# Patient Record
Sex: MALE | Race: WHITE | HISPANIC OR LATINO | Employment: FULL TIME | URBAN - METROPOLITAN AREA
[De-identification: names, ages, dates, MRNs, and addresses within clinical notes are randomized per-mention and may not be internally consistent; named-entity substitution may affect disease eponyms.]

---

## 2017-10-27 ENCOUNTER — HOSPITAL ENCOUNTER (EMERGENCY)
Facility: HOSPITAL | Age: 32
Discharge: HOME | End: 2017-10-27
Attending: EMERGENCY MEDICINE
Payer: OTHER MISCELLANEOUS

## 2017-10-27 VITALS
OXYGEN SATURATION: 97 % | WEIGHT: 230 LBS | HEART RATE: 83 BPM | RESPIRATION RATE: 16 BRPM | SYSTOLIC BLOOD PRESSURE: 130 MMHG | TEMPERATURE: 98 F | DIASTOLIC BLOOD PRESSURE: 90 MMHG

## 2017-10-27 DIAGNOSIS — R03.0 ELEVATED BLOOD PRESSURE READING WITHOUT DIAGNOSIS OF HYPERTENSION: ICD-10-CM

## 2017-10-27 DIAGNOSIS — Z23 NEED FOR PROPHYLACTIC VACCINATION WITH COMBINED DIPHTHERIA-TETANUS-PERTUSSIS (DTAP) VACCINE: ICD-10-CM
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
## 2017-10-27 LAB
ANION GAP SERPL CALC-SCNC: 11 MMOL/L
BASOPHILS # BLD AUTO: 0.07 K/UL
BASOPHILS NFR BLD: 1 %
BUN SERPL-MCNC: 14 MG/DL
CALCIUM SERPL-MCNC: 9.9 MG/DL
CHLORIDE SERPL-SCNC: 105 MMOL/L
CO2 SERPL-SCNC: 27 MMOL/L
CREAT SERPL-MCNC: 1 MG/DL
DIFFERENTIAL METHOD: ABNORMAL
EOSINOPHIL # BLD AUTO: 0.1 K/UL
EOSINOPHIL NFR BLD: 1 %
ERYTHROCYTE [DISTWIDTH] IN BLOOD BY AUTOMATED COUNT: 12.6 %
EST. GFR  (AFRICAN AMERICAN): >60 ML/MIN/1.73 M^2
EST. GFR  (NON AFRICAN AMERICAN): >60 ML/MIN/1.73 M^2
GLUCOSE SERPL-MCNC: 86 MG/DL
HCT VFR BLD AUTO: 46.4 %
HGB BLD-MCNC: 16.1 G/DL
HIV1+2 IGG SERPL QL IA.RAPID: NEGATIVE
LYMPHOCYTES # BLD AUTO: 1.9 K/UL
LYMPHOCYTES NFR BLD: 26.5 %
MCH RBC QN AUTO: 30.4 PG
MCHC RBC AUTO-ENTMCNC: 34.7 G/DL
MCV RBC AUTO: 88 FL
MONOCYTES # BLD AUTO: 0.5 K/UL
MONOCYTES NFR BLD: 6.5 %
NEUTROPHILS # BLD AUTO: 4.6 K/UL
NEUTROPHILS NFR BLD: 64.6 %
PLATELET # BLD AUTO: 370 K/UL
PMV BLD AUTO: 9 FL
POTASSIUM SERPL-SCNC: 4 MMOL/L
RBC # BLD AUTO: 5.3 M/UL
SODIUM SERPL-SCNC: 143 MMOL/L
WBC # BLD AUTO: 7.09 K/UL

## 2017-10-27 PROCEDURE — 85025 COMPLETE CBC W/AUTO DIFF WBC: CPT

## 2017-10-27 PROCEDURE — 25000003 PHARM REV CODE 250: Performed by: NURSE PRACTITIONER

## 2017-10-27 PROCEDURE — 86703 HIV-1/HIV-2 1 RESULT ANTBDY: CPT

## 2017-10-27 PROCEDURE — 90715 TDAP VACCINE 7 YRS/> IM: CPT | Performed by: NURSE PRACTITIONER

## 2017-10-27 PROCEDURE — 99284 EMERGENCY DEPT VISIT MOD MDM: CPT

## 2017-10-27 PROCEDURE — 90471 IMMUNIZATION ADMIN: CPT | Performed by: NURSE PRACTITIONER

## 2017-10-27 PROCEDURE — 25000003 PHARM REV CODE 250: Performed by: EMERGENCY MEDICINE

## 2017-10-27 PROCEDURE — 86706 HEP B SURFACE ANTIBODY: CPT

## 2017-10-27 PROCEDURE — 86703 HIV-1/HIV-2 1 RESULT ANTBDY: CPT | Mod: 91

## 2017-10-27 PROCEDURE — 86705 HEP B CORE ANTIBODY IGM: CPT

## 2017-10-27 PROCEDURE — 63600175 PHARM REV CODE 636 W HCPCS: Performed by: NURSE PRACTITIONER

## 2017-10-27 PROCEDURE — 80048 BASIC METABOLIC PNL TOTAL CA: CPT

## 2017-10-27 RX ORDER — TENOFOVIR DISOPROXIL FUMARATE 300 MG/1
300 TABLET, FILM COATED ORAL ONCE
Status: COMPLETED | OUTPATIENT
Start: 2017-10-27 | End: 2017-10-27

## 2017-10-27 RX ORDER — EMTRICITABINE AND TENOFOVIR DISOPROXIL FUMARATE 200; 300 MG/1; MG/1
1 TABLET, FILM COATED ORAL
Status: DISCONTINUED | OUTPATIENT
Start: 2017-10-27 | End: 2017-10-27 | Stop reason: RX

## 2017-10-27 RX ORDER — EMTRICITABINE 200 MG/1
200 CAPSULE ORAL ONCE
Status: COMPLETED | OUTPATIENT
Start: 2017-10-27 | End: 2017-10-27

## 2017-10-27 RX ADMIN — RALTEGRAVIR 400 MG: 400 TABLET, FILM COATED ORAL at 01:10

## 2017-10-27 RX ADMIN — CLOSTRIDIUM TETANI TOXOID ANTIGEN (FORMALDEHYDE INACTIVATED), CORYNEBACTERIUM DIPHTHERIAE TOXOID ANTIGEN (FORMALDEHYDE INACTIVATED), BORDETELLA PERTUSSIS TOXOID ANTIGEN (GLUTARALDEHYDE INACTIVATED), BORDETELLA PERTUSSIS FILAMENTOUS HEMAGGLUTININ ANTIGEN (FORMALDEHYDE INACTIVATED), BORDETELLA PERTUSSIS PERTACTIN ANTIGEN, AND BORDETELLA PERTUSSIS FIMBRIAE 2/3 ANTIGEN 0.5 ML: 5; 2; 2.5; 5; 3; 5 INJECTION, SUSPENSION INTRAMUSCULAR at 02:10

## 2017-10-27 RX ADMIN — TENOFOVIR DISOPROXIL FUMARATE 300 MG: 300 TABLET, COATED ORAL at 01:10

## 2017-10-27 RX ADMIN — EMTRICITABINE 200 MG: 200 CAPSULE ORAL at 01:10

## 2017-10-27 NOTE — ED NOTES
IPSO  searching for weapons and stuck with needle in suspects pocket. Immediate bleeding at puncture site. On arrival no bleeding.

## 2017-10-27 NOTE — ED PROVIDER NOTES
Encounter Date: 10/27/2017       History     Chief Complaint   Patient presents with    Body Fluid Exposure     right palm of hand accidently needle stick     The history is provided by the patient.   Hand Injury    The incident occurred just prior to arrival. The incident occurred at work. Injury mechanism: Patient was accidentally stuck with a used hypodermic needle to the palmar surface of his right hand. The pain is present in the right hand. The pain is at a severity of 0/10. Pertinent negatives include no fever. He reports no foreign bodies present. Treatments tried: soap and water wash.   Mr. Chavez presents to the emergency department with complaints of an accidental needlestick to his right hand.  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  The patient denies any further complaints at this time.        PCP:   David Good MD        Review of patient's allergies indicates:  No Known Allergies  History reviewed. No pertinent past medical history.  Past Surgical History:   Procedure Laterality Date    NO PAST SURGERIES       History reviewed. No pertinent family history.  Social History   Substance Use Topics    Smoking status: Never Smoker    Smokeless tobacco: Never Used    Alcohol use Yes      Comment: Socially     Review of Systems   Constitutional: Negative for fever.   HENT: Negative for sore throat.    Respiratory: Negative for shortness of breath.    Cardiovascular: Negative for chest pain.   Gastrointestinal: Negative for nausea.   Genitourinary: Negative for dysuria.   Musculoskeletal: Negative for back pain.   Skin: Positive for wound (needlestick to right hand). Negative for rash.   Neurological: Negative for weakness.   Hematological: Does not bruise/bleed easily.       Physical Exam     Initial Vitals [10/27/17 1348]   BP Pulse Resp Temp SpO2   (!) 132/98  93 18 98 °F (36.7 °C) 97 %      MAP       109.33         Physical Exam    Nursing note and vitals reviewed.  Constitutional: He appears well-developed and well-nourished. He is cooperative. He does not appear ill. No distress.   HENT:   Head: Normocephalic and atraumatic.   Nose: Nose normal.   Mouth/Throat: Uvula is midline, oropharynx is clear and moist and mucous membranes are normal.   Eyes: Conjunctivae, EOM and lids are normal. Pupils are equal, round, and reactive to light.   Neck: Trachea normal and normal range of motion. Neck supple.   Cardiovascular: Normal rate, regular rhythm, intact distal pulses and normal pulses.   Pulmonary/Chest: Effort normal. No respiratory distress.   Musculoskeletal: Normal range of motion. He exhibits no edema.        Right hand: He exhibits normal range of motion, no bony tenderness, normal capillary refill and no swelling. Normal sensation noted. Normal strength noted.   Neurological: He is alert and oriented to person, place, and time. He has normal strength. GCS eye subscore is 4. GCS verbal subscore is 5. GCS motor subscore is 6.   Neurovascular intact to all extremities. Normal gait.    Skin: Skin is warm and dry. Capillary refill takes less than 2 seconds. No rash noted. There is erythema (small puncture wound secondary to needlestick to the palmar surface of the right hand just distal of the third MCP - puncture wound measures approximately 0.1 mm - it is surrounded by an erythematous area measuring 0.5 mm - no bleeding or drainage noted ).   Psychiatric: He has a normal mood and affect. His speech is normal and behavior is normal. Thought content normal.                 ED Course   Procedures     ED Abnormal Lab Results:   Labs Reviewed   CBC W/ AUTO DIFFERENTIAL - Abnormal; Notable for the following:        Result Value    Platelets 370 (*)     MPV 9.0 (*)     All other components within normal limits   RAPID HIV   BASIC METABOLIC PANEL   HEPATITIS B SURFACE ANTIBODY    HEPATITIS B CORE ANTIBODY, IGM   HIV 1 / 2 ANTIBODY         ED Lab Results:   Results for orders placed or performed during the hospital encounter of 10/27/17   Rapid HIV   Result Value Ref Range    HIV Rapid Testing Negative Negative   CBC auto differential   Result Value Ref Range    WBC 7.09 3.90 - 12.70 K/uL    RBC 5.30 4.60 - 6.20 M/uL    Hemoglobin 16.1 14.0 - 18.0 g/dL    Hematocrit 46.4 40.0 - 54.0 %    MCV 88 82 - 98 fL    MCH 30.4 27.0 - 31.0 pg    MCHC 34.7 32.0 - 36.0 g/dL    RDW 12.6 11.5 - 14.5 %    Platelets 370 (H) 150 - 350 K/uL    MPV 9.0 (L) 9.2 - 12.9 fL    Gran # 4.6 1.8 - 7.7 K/uL    Lymph # 1.9 1.0 - 4.8 K/uL    Mono # 0.5 0.3 - 1.0 K/uL    Eos # 0.1 0.0 - 0.5 K/uL    Baso # 0.07 0.00 - 0.20 K/uL    Gran% 64.6 38.0 - 73.0 %    Lymph% 26.5 18.0 - 48.0 %    Mono% 6.5 4.0 - 15.0 %    Eosinophil% 1.0 0.0 - 8.0 %    Basophil% 1.0 0.0 - 1.9 %    Differential Method Automated    Basic metabolic panel   Result Value Ref Range    Sodium 143 136 - 145 mmol/L    Potassium 4.0 3.5 - 5.1 mmol/L    Chloride 105 95 - 110 mmol/L    CO2 27 23 - 29 mmol/L    Glucose 86 70 - 110 mg/dL    BUN, Bld 14 6 - 20 mg/dL    Creatinine 1.0 0.5 - 1.4 mg/dL    Calcium 9.9 8.7 - 10.5 mg/dL    Anion Gap 11 8 - 16 mmol/L    eGFR if African American >60.0 >60 mL/min/1.73 m^2    eGFR if non African American >60.0 >60 mL/min/1.73 m^2       ED Medications:   Medications   raltegravir tablet 400 mg (400 mg Oral Given 10/27/17 1357)   emtricitabine capsule 200 mg (200 mg Oral Given 10/27/17 3257)   tenofovir tablet 300 mg (300 mg Oral Given 10/27/17 1357)   Tdap vaccine injection 0.5 mL (0.5 mLs Intramuscular Given 10/27/17 1444)           ED Course Vitals  Vitals:    10/27/17 1348 10/27/17 1444   BP: (!) 132/98 (!) 130/90   BP Location: Left arm    Patient Position: Sitting    Pulse: 93 83   Resp: 18 16   Temp: 98 °F (36.7 °C)    TempSrc: Oral    SpO2: 97%    Weight: 104.3 kg (230 lb)          1505 HOURS RE-EVALUATION &  DISPOSITION:   Reassessment at the time of disposition demonstrates that the patient is resting comfortably in no acute distress.  He has remained hemodynamically stable throughout the entire ED visit and is without objective evidence for acute process requiring urgent intervention or hospitalization. I discussed test results and provided counseling to patient with regard to condition, the treatment plan, specific conditions for return, and the importance of follow up.  Answered questions at this time. The patient is stable for discharge.               Medical Decision Making:   History:   Old Records Summarized: records from clinic visits.  Clinical Tests:   Lab Tests: Ordered and Reviewed  Other:   I have discussed this case with another health care provider.       <> Summary of the Discussion: Attending Physician:  Roberto Alejandre MD                     Clinical Impression:       ICD-10-CM ICD-9-CM   1. Accidental needlestick injury with exposure to body fluid Z77.21 E920.4    W27.3XXA V87.39   2. Legal intervention involving other sharp objects, law enforcement official injured, initial encounter Y35.491A E974   3. Need for prophylactic vaccination with combined diphtheria-tetanus-pertussis (DTaP) vaccine Z23 V06.1   4. Elevated blood pressure reading without diagnosis of hypertension R03.0 796.2         Disposition:   Disposition: Discharged  Condition: Stable  I discussed with patient that the evaluation in the emergency department does not suggest any emergent or life threatening medical condition requiring immediate intervention beyond what was provided in the ED, and I believe patient is safe for discharge.  Regardless, an unremarkable evaluation in the ED does not preclude the development or presence of a serious of life threatening condition. As such, patient was instructed to return immediately for any worsening or change in current symptoms. I also discussed the results of my evaluation and  diagnostics with patient and he concurs with the evaluation and management plan.  Detailed written and verbal instructions provided to patient and he expressed a verbal understanding of the discharge instructions and overall management plan. Reiterated the importance of medication administration and safety and advised patient to follow up with primary care provider in 3-5 days or sooner if needed.  Also advised patient to return to the ER for any complications.     Regarding ELEVATED BLOOD PRESSURE WITHOUT DIAGNOSIS OF HYPERTENSION/PRE-HYPERTENSION, I advised patient to: keep a record of blood pressure results; avoid medications that contain heart stimulants, including over-the-counter drugs such as decongestants; maintain a healthy weight; cut back on sodium intake (i.e., limit canned, dried, packaged, and fast foods and dont add salt to food); follow the DASH (Dietary Approaches to Stop Hypertension) eating plan which recommends vegetables, fruits, whole gains, and other heart healthy foods; begin an exercise program that includes  aerobic exercise 3 to 4 times a week for an average of 40 minutes at a time (with approval of cardiologist or primary care provider); limit drinks that contain alcohol and caffeine; control levels of emotional stress; and seek emergency care for any shortness of breath, chest pain, difficulty speaking, confusion, or visual changes.  I also recommended following up with the primary care provider for re-evaluation of blood pressure and determine if further treatment may be required.    Regarding TETANUS VACCINATIONS, I advised patient that the tetanus-diphtheria vaccine is given to as a booster shot every 10 years, or after an exposure to tetanus under some circumstances (usually after five years depending on injury). Patient was educated on importance of vaccination, source of bacterial infection (usually found in soil, dust and manure and enter the body through breaks in the skin -  usually cuts or puncture wounds caused by contaminated objects), and complications of tetanus/lockjaw (muscles spasms of the jaw, seizures, pneumonia, difficulty swallowing, pulmonary embolism, dyspnea, and death). I discussed signs and symptoms of reactions such as: high fever; weakness; unusual behavior; dyspnea; hoarseness or wheezing; throat swelling; dizziness; tachycardia; hives; and paleness. I emphasized the importance of seeking help immediately at nearest emergency department should signs and symptoms of anaphylaxis develop (i.e., mouth or throat swelling, wheezing, dyspnea, palpitations, or weakness).  I recommended that the patient keep their vaccination schedule up to date and document that they received a tetanus immunization today.            Follow-up Information     Call  David Good MD.    Specialty:  Family Medicine  Why:  If symptoms worsen or as needed  Contact information:  [address]  [facility] [address]  [phone]                                  Demetrius Cantu NP  10/27/17 1019

## 2017-10-27 NOTE — ED NOTES
Pt stable, in NAD, and states no further needs at this time. NP aware of vitals. Pt to be d/c'd home.

## 2017-10-30 LAB
HBV CORE IGM SERPL QL IA: NEGATIVE
HBV SURFACE AB SER-ACNC: NEGATIVE M[IU]/ML
HIV 1+2 AB+HIV1 P24 AG SERPL QL IA: NEGATIVE

## 2017-11-30 ENCOUNTER — OFFICE VISIT (OUTPATIENT)
Dept: INTERNAL MEDICINE | Facility: CLINIC | Age: 32
End: 2017-11-30
Payer: COMMERCIAL

## 2017-11-30 VITALS
HEART RATE: 102 BPM | TEMPERATURE: 97 F | WEIGHT: 235.44 LBS | OXYGEN SATURATION: 96 % | HEIGHT: 71 IN | DIASTOLIC BLOOD PRESSURE: 86 MMHG | BODY MASS INDEX: 32.96 KG/M2 | RESPIRATION RATE: 18 BRPM | SYSTOLIC BLOOD PRESSURE: 133 MMHG

## 2017-11-30 DIAGNOSIS — J00 COMMON COLD: Primary | ICD-10-CM

## 2017-11-30 LAB
FLUAV AG SPEC QL IA: NEGATIVE
FLUBV AG SPEC QL IA: NEGATIVE
SPECIMEN SOURCE: NORMAL

## 2017-11-30 PROCEDURE — 87400 INFLUENZA A/B EACH AG IA: CPT | Mod: 59,PO

## 2017-11-30 PROCEDURE — 99203 OFFICE O/P NEW LOW 30 MIN: CPT | Mod: S$GLB,,, | Performed by: FAMILY MEDICINE

## 2017-11-30 PROCEDURE — 99999 PR PBB SHADOW E&M-EST. PATIENT-LVL III: CPT | Mod: PBBFAC,,, | Performed by: FAMILY MEDICINE

## 2017-11-30 RX ORDER — TRAZODONE HYDROCHLORIDE 50 MG/1
50 TABLET ORAL
COMMUNITY
Start: 2017-06-09 | End: 2020-03-06

## 2017-11-30 RX ORDER — IBUPROFEN 200 MG
400 TABLET ORAL EVERY 6 HOURS PRN
COMMUNITY

## 2017-11-30 NOTE — PATIENT INSTRUCTIONS

## 2017-11-30 NOTE — LETTER
11/30/2017                 [facility]  [address] [address] [address]  Phone: [phone]   11/30/2017    Patient: Marco Antonio Chavez   YOB: 1985   Date of Visit: 11/30/2017       To Whom it May Concern:    Marco Antonio Chavez was seen in my clinic on 11/30/2017. He may return to work on 12/1/17.    If you have any questions or concerns, please don't hesitate to call.    Sincerely,         Meli Giraldo LPN

## 2017-12-05 ENCOUNTER — OFFICE VISIT (OUTPATIENT)
Dept: INTERNAL MEDICINE | Facility: CLINIC | Age: 32
End: 2017-12-05
Payer: COMMERCIAL

## 2017-12-05 VITALS
SYSTOLIC BLOOD PRESSURE: 124 MMHG | HEART RATE: 93 BPM | DIASTOLIC BLOOD PRESSURE: 92 MMHG | BODY MASS INDEX: 33.23 KG/M2 | RESPIRATION RATE: 18 BRPM | TEMPERATURE: 97 F | WEIGHT: 232.13 LBS | HEIGHT: 70 IN | OXYGEN SATURATION: 98 %

## 2017-12-05 DIAGNOSIS — J01.00 ACUTE NON-RECURRENT MAXILLARY SINUSITIS: Primary | ICD-10-CM

## 2017-12-05 PROCEDURE — 99999 PR PBB SHADOW E&M-EST. PATIENT-LVL III: CPT | Mod: PBBFAC,,, | Performed by: FAMILY MEDICINE

## 2017-12-05 PROCEDURE — 99214 OFFICE O/P EST MOD 30 MIN: CPT | Mod: S$GLB,,, | Performed by: FAMILY MEDICINE

## 2017-12-05 RX ORDER — AMOXICILLIN AND CLAVULANATE POTASSIUM 875; 125 MG/1; MG/1
1 TABLET, FILM COATED ORAL EVERY 12 HOURS
Qty: 14 TABLET | Refills: 0 | Status: SHIPPED | OUTPATIENT
Start: 2017-12-05 | End: 2017-12-12

## 2017-12-05 NOTE — PROGRESS NOTES
"Subjective:       Patient ID: Marco Antonio Chavez is a 32 y.o. male.    Chief Complaint: Fever (mostly night) and Fatigue    Sinus Problem   This is a new problem. The current episode started 1 to 4 weeks ago. The problem has been gradually worsening since onset. The maximum temperature recorded prior to his arrival was 102 - 102.9 F. The pain is mild. Associated symptoms include chills, congestion, coughing, headaches, sinus pressure and a sore throat. Past treatments include acetaminophen and oral decongestants. The treatment provided mild relief.     Has been having fever every night for the last few days    There is no problem list on file for this patient.      No family history on file.  Past Surgical History:   Procedure Laterality Date    NO PAST SURGERIES           Current Outpatient Prescriptions:     ibuprofen (ADVIL,MOTRIN) 200 MG tablet, Take 400 mg by mouth every 6 (six) hours as needed., Disp: , Rfl:     traZODone (DESYREL) 50 MG tablet, Take 50 mg by mouth., Disp: , Rfl:     amoxicillin-clavulanate 875-125mg (AUGMENTIN) 875-125 mg per tablet, Take 1 tablet by mouth every 12 (twelve) hours., Disp: 14 tablet, Rfl: 0    Review of Systems   Constitutional: Positive for chills, fatigue and fever.   HENT: Positive for congestion, sinus pressure and sore throat.    Respiratory: Positive for cough.    Neurological: Positive for headaches.       Objective:   BP (!) 124/92 (BP Location: Right arm, Patient Position: Sitting, BP Method: Medium (Manual))   Pulse 93   Temp 97.4 °F (36.3 °C) (Tympanic)   Resp 18   Ht 5' 10" (1.778 m)   Wt 105.3 kg (232 lb 2.3 oz)   SpO2 98%   BMI 33.31 kg/m²      Physical Exam   Constitutional: He is oriented to person, place, and time. He appears well-developed and well-nourished. No distress.   HENT:   Head: Normocephalic and atraumatic.   Nose: Mucosal edema present. Right sinus exhibits maxillary sinus tenderness. Left sinus exhibits maxillary sinus tenderness.   Eyes: " Conjunctivae and EOM are normal. Pupils are equal, round, and reactive to light. Right eye exhibits no discharge. Left eye exhibits no discharge.   Neck: No thyromegaly present.   Cardiovascular: Normal rate, regular rhythm and normal heart sounds.    No murmur heard.  Pulmonary/Chest: Effort normal and breath sounds normal. No respiratory distress. He has no wheezes. He has no rales.   Abdominal: Soft. He exhibits no distension.   Musculoskeletal: He exhibits no edema.   Neurological: He is alert and oriented to person, place, and time.   Skin: Skin is warm. No rash noted. He is not diaphoretic.   Psychiatric: He has a normal mood and affect. His behavior is normal.   Vitals reviewed.      Assessment & Plan      1. Acute non-recurrent maxillary sinusitis  Due to symptoms worsening and waxing/waning fever will start on augmentin. Recommended using tylenol to help with fever. Avoid using ibuprofen and use mylanta to help with stomach irritation.

## 2020-03-06 ENCOUNTER — HOSPITAL ENCOUNTER (EMERGENCY)
Facility: HOSPITAL | Age: 35
Discharge: HOME | End: 2020-03-06
Attending: EMERGENCY MEDICINE
Payer: COMMERCIAL

## 2020-03-06 VITALS
TEMPERATURE: 98 F | OXYGEN SATURATION: 99 % | SYSTOLIC BLOOD PRESSURE: 130 MMHG | BODY MASS INDEX: 33.49 KG/M2 | DIASTOLIC BLOOD PRESSURE: 88 MMHG | RESPIRATION RATE: 17 BRPM | WEIGHT: 233.94 LBS | HEART RATE: 92 BPM | HEIGHT: 70 IN

## 2020-03-06 DIAGNOSIS — M79.642 LEFT HAND PAIN: ICD-10-CM
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
## 2020-03-06 DIAGNOSIS — T75.4XXA INJURY FROM ELECTROSHOCK GUN, INITIAL ENCOUNTER: Primary | ICD-10-CM

## 2020-03-06 PROCEDURE — 25000003 PHARM REV CODE 250: Mod: ER | Performed by: EMERGENCY MEDICINE

## 2020-03-06 PROCEDURE — 99283 EMERGENCY DEPT VISIT LOW MDM: CPT | Mod: 25,ER

## 2020-03-06 RX ORDER — AMLODIPINE BESYLATE 5 MG/1
5 TABLET ORAL DAILY
COMMUNITY

## 2020-03-06 RX ORDER — ESZOPICLONE 2 MG/1
3 TABLET, FILM COATED ORAL NIGHTLY
COMMUNITY

## 2020-03-06 RX ORDER — DEXTROAMPHETAMINE SACCHARATE, AMPHETAMINE ASPARTATE MONOHYDRATE, DEXTROAMPHETAMINE SULFATE AND AMPHETAMINE SULFATE 7.5; 7.5; 7.5; 7.5 MG/1; MG/1; MG/1; MG/1
15 CAPSULE, EXTENDED RELEASE ORAL 3 TIMES DAILY
COMMUNITY

## 2020-03-06 RX ORDER — ERGOCALCIFEROL 1.25 MG/1
50000 CAPSULE ORAL
COMMUNITY

## 2020-03-06 RX ORDER — ACETAMINOPHEN 500 MG
1000 TABLET ORAL
Status: COMPLETED | OUTPATIENT
Start: 2020-03-06 | End: 2020-03-06

## 2020-03-06 RX ADMIN — ACETAMINOPHEN 1000 MG: 500 TABLET ORAL at 01:03

## 2020-03-06 RX ADMIN — BACITRACIN ZINC NEOMYCIN SULFATE POLYMYXIN B SULFATE: 400; 3.5; 5 OINTMENT TOPICAL at 01:03

## 2020-03-06 NOTE — ED NOTES
All DC instructions given and explained to pt. Voiced understanding. Said he called supervisor and does not need a work excuse. DC to home.

## 2020-03-06 NOTE — ED PROVIDER NOTES
Encounter Date: 3/6/2020       History     Chief Complaint   Patient presents with    Hand Injury     work related injury- tased self in left hand 45 minutes PTA.  Pt is right handed.     33 y/o M with PMH of HTN here with c/o of taser injury to left hand just prior to arrival while working. Patient had probe's insert into his left hand, and was tased for less than 5 seconds. The probes were easily removed after the injury. Patient having some moderate residual pain in the left hand, and some difficulty moving the hand, but this is improving as time passes. Patient denies any other injury, LOC, chest pain, burns, or other symptoms at this time.         Review of patient's allergies indicates:   Allergen Reactions    Naproxen sodium Hives and Shortness Of Breath     No reaction to ibuprofen     Past Medical History:   Diagnosis Date    Attention and concentration deficit     Hypertension      Past Surgical History:   Procedure Laterality Date    NO PAST SURGERIES       History reviewed. No pertinent family history.  Social History     Tobacco Use    Smoking status: Never Smoker    Smokeless tobacco: Never Used   Substance Use Topics    Alcohol use: Yes     Comment: Socially    Drug use: No     Review of Systems   Constitutional: Negative for chills and fever.   HENT: Negative for congestion and dental problem.    Eyes: Negative for pain and visual disturbance.   Respiratory: Negative for cough and shortness of breath.    Cardiovascular: Negative for chest pain and palpitations.   Gastrointestinal: Negative for abdominal pain, diarrhea, nausea and vomiting.   Genitourinary: Negative for dysuria and flank pain.   Musculoskeletal: Negative for back pain and neck pain.        Left hand pain   Skin: Positive for wound. Negative for rash.   Neurological: Positive for weakness. Negative for numbness and headaches.        Left hand weakness       Physical Exam     Initial Vitals [03/06/20 1316]   BP Pulse Resp Temp  SpO2   (!) 140/96 103 18 98.9 °F (37.2 °C) 99 %      MAP       --         Physical Exam    Constitutional: He appears well-developed and well-nourished. No distress.   HENT:   Head: Normocephalic and atraumatic.   Eyes: EOM are normal. Pupils are equal, round, and reactive to light.   Neck: Normal range of motion. Neck supple.   Cardiovascular: Normal rate and regular rhythm.   Pulmonary/Chest: Breath sounds normal. No respiratory distress.   Abdominal: He exhibits no distension. There is no tenderness.   Musculoskeletal: He exhibits no tenderness.   Left hand motion limited due to pain and localized edema to the web between the 1st and 2nd digit. There are puncture marks just proximal to the 2nd digit on the palmar side, and a puncture wound where the proximal palmar crease and thenar crease meet. There is tenderness over the thenar eminence and in the web between the 1st and 2nd digit. No obvious bone deformity or residual FB. 2nd digit flexion is limited due to pain.    Neurological: He is alert and oriented to person, place, and time.   5/5 STR in the Left Hand. No numbness in the left hand.    Skin: Skin is warm and dry.   Psychiatric: He has a normal mood and affect.               ED Course   Procedures  Labs Reviewed - No data to display       Imaging Results          X-Ray Hand 3 View Left (Final result)  Result time 03/06/20 13:36:35    Final result by Josiah Swanson MD (03/06/20 13:36:35)                 Impression:      No acute fracture or dislocation.      Electronically signed by: Josiah Swanson MD  Date:    03/06/2020  Time:    13:36             Narrative:    EXAMINATION:  XR HAND COMPLETE 3 VIEW LEFT    CLINICAL HISTORY:  XR HAND COMPLETE 3 VIEW LEFT    COMPARISON:  None    FINDINGS:  Three views of the left hand were obtained.    No evidence of acute fracture or dislocation.  Bony mineralization is normal.  Soft tissues are unremarkable.                                                   ED Course  as of Mar 06 1344   Fri Mar 06, 2020   1340 Tetanus UTD. The patient has no superficial burns. Patient symptoms should improve over time. Will have him see his PCP in 2 days for wound re-check, and repeat exam.     1:42 PM Reassessment: I reassessed the pt.  The pt is resting comfortably and is NAD.  Pt states their sx have improved. I discussed test results, shared treatment plan, specific conditions for return, and the need for f/u. I  answered their questions at this time.  Pt understands and agrees to the plan.  The pt has remained hemodynamically stable through ED course and is stable for discharge.       [BA]      ED Course User Index  [BA] Alexandro Norris MD                Clinical Impression:       ICD-10-CM ICD-9-CM   1. Injury from electroshock gun, initial encounter T75.4XXA 994.8   2. Left hand pain M79.642 729.5             ED Disposition Condition    Discharge Stable        ED Prescriptions     None        Follow-up Information     Follow up With Specialties Details Why Contact Info    David Good MD Family Medicine Schedule an appointment as soon as possible for a visit in 2 days For wound re-check [address] [facility] [address]  [facility] [address]  [phone]                                       Alexandro Norris MD  03/06/20 0255

## 2020-03-06 NOTE — ED NOTES
Tases were removed by coworker. Pt said he was tased for about 5 seconds.  Probes were in for total of 10 minutes.  Dr. Norris removed lucas in triage and assessed.  Pain to left hand with pain to entire palm- specifially left thumb and left index finger with numbness to left palm up to left second and third fingers.  Cap refill less than 2 seconds. Able to fill me touching.  Will xray.   Last tetanus 10/27/17 per Epic

## 2020-03-06 NOTE — ED NOTES
Neosporin to left hand, with nonstick gauze and wrapped with Tasha.   Educated pt on wound care, monitoring for S & S of infection, and need for MD f/u.

## 2021-09-16 ENCOUNTER — IMMUNIZATION (OUTPATIENT)
Dept: PRIMARY CARE CLINIC | Facility: CLINIC | Age: 36
End: 2021-09-16

## 2021-09-16 DIAGNOSIS — Z23 NEED FOR VACCINATION: Primary | ICD-10-CM

## 2021-09-16 PROCEDURE — 91301 COVID-19, MRNA, LNP-S, PF, 100 MCG/0.5 ML DOSE VACCINE: ICD-10-PCS | Mod: S$GLB,,, | Performed by: FAMILY MEDICINE

## 2021-09-16 PROCEDURE — 0012A COVID-19, MRNA, LNP-S, PF, 100 MCG/0.5 ML DOSE VACCINE: ICD-10-PCS | Mod: CV19,S$GLB,, | Performed by: FAMILY MEDICINE

## 2021-09-16 PROCEDURE — 0012A COVID-19, MRNA, LNP-S, PF, 100 MCG/0.5 ML DOSE VACCINE: CPT | Mod: CV19,S$GLB,, | Performed by: FAMILY MEDICINE

## 2021-09-16 PROCEDURE — 91301 COVID-19, MRNA, LNP-S, PF, 100 MCG/0.5 ML DOSE VACCINE: CPT | Mod: S$GLB,,, | Performed by: FAMILY MEDICINE

## 2021-10-25 ENCOUNTER — CLINICAL SUPPORT (OUTPATIENT)
Dept: OTHER | Facility: CLINIC | Age: 36
End: 2021-10-25
Payer: COMMERCIAL

## 2021-10-25 DIAGNOSIS — Z00.8 ENCOUNTER FOR OTHER GENERAL EXAMINATION: ICD-10-CM

## 2021-10-26 VITALS — HEIGHT: 71 IN | BODY MASS INDEX: 32.62 KG/M2

## 2021-10-26 LAB
HDLC SERPL-MCNC: 28 MG/DL
POC CHOLESTEROL, LDL (DOCK): 70 MG/DL
POC CHOLESTEROL, TOTAL: 130 MG/DL
POC GLUCOSE, FASTING: 103 MG/DL (ref 60–110)
TRIGL SERPL-MCNC: 158 MG/DL

## 2023-10-20 ENCOUNTER — HOSPITAL ENCOUNTER (EMERGENCY)
Facility: HOSPITAL | Age: 38
Discharge: HOME | End: 2023-10-20
Attending: EMERGENCY MEDICINE
Payer: COMMERCIAL

## 2023-10-20 VITALS
HEART RATE: 83 BPM | SYSTOLIC BLOOD PRESSURE: 126 MMHG | DIASTOLIC BLOOD PRESSURE: 79 MMHG | RESPIRATION RATE: 18 BRPM | OXYGEN SATURATION: 97 % | HEIGHT: 71 IN | BODY MASS INDEX: 30.8 KG/M2 | WEIGHT: 220 LBS

## 2023-10-20 DIAGNOSIS — Z77.29 EXPOSURE TO POTENTIALLY HAZARDOUS SUBSTANCE: Primary | ICD-10-CM

## 2023-10-20 PROCEDURE — 99282 EMERGENCY DEPT VISIT SF MDM: CPT | Mod: ER

## 2023-10-20 RX ORDER — ZOLPIDEM TARTRATE 10 MG/1
1 TABLET ORAL NIGHTLY PRN
COMMUNITY
Start: 2023-09-18

## 2023-10-20 RX ORDER — ROSUVASTATIN CALCIUM 10 MG/1
10 TABLET, COATED ORAL NIGHTLY
COMMUNITY
Start: 2023-09-29

## 2023-10-20 NOTE — ED PROVIDER NOTES
Encounter Date: 10/20/2023       History     Chief Complaint   Patient presents with    Fentanyl Exposure     [place]     Patient is a 38-year-old male who presents today after an occupational exposure.  Patient is a  and was working at a traffic stop.  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  Patient states he was putting the drugs on a scale.  At some point a white powder substance flew up into the air into his face incident occurred approximately 1 hour ago.  Coworkers state that he has been acting differently since.  Appears to be under the influence.  Patient exhibits slow response time to basic questions.  Patient reports some tingling in his right hand.  No excessive drowsiness, headache, chest pain, shortness of breath, syncope.  No Narcan or other medication was administered prior to arrival      Review of patient's allergies indicates:   Allergen Reactions    Naproxen sodium Hives and Shortness Of Breath     No reaction to ibuprofen     Past Medical History:   Diagnosis Date    Attention and concentration deficit     Hypertension      Past Surgical History:   Procedure Laterality Date    NO PAST SURGERIES       No family history on file.  Social History     Tobacco Use    Smoking status: Never    Smokeless tobacco: Never   Substance Use Topics    Alcohol use: Yes     Comment: Socially    Drug use: No     Review of Systems   Neurological:         Tingling R hand   Psychiatric/Behavioral:  Positive for confusion.    All other systems reviewed and are negative.      Physical Exam     Initial Vitals [10/20/23 0031]   BP Pulse Resp Temp SpO2   128/81 86 20 -- 100 %      MAP       --         Physical Exam    Nursing note and vitals reviewed.  Constitutional: He appears well-developed and well-nourished. No distress.   HENT:   Head: Normocephalic and atraumatic.   Eyes: Conjunctivae and EOM are normal. Pupils are equal, round, and reactive to light.   Pupils not dilated or pinpoint   Neck: Neck  supple. No tracheal deviation present.   Cardiovascular:  Normal rate, regular rhythm, normal heart sounds and intact distal pulses.           Pulmonary/Chest: Breath sounds normal. No respiratory distress.   Musculoskeletal:         General: No tenderness or edema. Normal range of motion.      Cervical back: Neck supple.     Neurological: He is alert and oriented to person, place, and time. GCS score is 15. GCS eye subscore is 4. GCS verbal subscore is 5. GCS motor subscore is 6.   Mild delayed response to basic questions, but is awake alert oriented and eventually answers all questions appropriately   Skin: Skin is warm. No rash noted. No erythema.   Psychiatric: He has a normal mood and affect. His behavior is normal.         ED Course   Procedures  Labs Reviewed - No data to display       Imaging Results    None          Medications - No data to display  Medical Decision Making  Accidental exposure to drugs (white powdery substance).  There was reportedly some initial concern about possible fentanyl.  Patient is not exhibiting any signs of opioid toxicity however.  He is awake, conversing.  Pupils not pinpoint.  No respiratory depression.  No need for Narcan.  Patient was observed in the emergency department with no worsening.  Mild improvement.  Suspect patient is under the influence of a drug, but no need for emergent intervention at this time.  Patient discharged home with ride at bedside    Amount and/or Complexity of Data Reviewed  Independent Historian:      Details: Police provide the details that he seemed slow to answer questions and when he was driving, he seemed to be driving somewhat erratically                               Clinical Impression:   Final diagnoses:  [Z77.29] Exposure to potentially hazardous substance (Primary)        ED Disposition Condition    Discharge Stable          ED Prescriptions    None       Follow-up Information       Follow up With Specialties Details Why Contact Info     [facility] - Emergency Dept Emergency Medicine  As needed, If symptoms worsen [address]  [facility] [id number] [phone]             Josiah Norris MD  10/20/23 0143

## 2024-01-03 ENCOUNTER — HOSPITAL ENCOUNTER (EMERGENCY)
Facility: HOSPITAL | Age: 39
Discharge: HOME | End: 2024-01-03
Attending: EMERGENCY MEDICINE
Payer: COMMERCIAL

## 2024-01-03 VITALS
OXYGEN SATURATION: 95 % | HEART RATE: 76 BPM | DIASTOLIC BLOOD PRESSURE: 83 MMHG | RESPIRATION RATE: 17 BRPM | WEIGHT: 215 LBS | SYSTOLIC BLOOD PRESSURE: 127 MMHG | BODY MASS INDEX: 29.99 KG/M2 | TEMPERATURE: 98 F

## 2024-01-03 DIAGNOSIS — R10.31 RIGHT LOWER QUADRANT ABDOMINAL PAIN: Primary | ICD-10-CM

## 2024-01-03 LAB
ALBUMIN SERPL BCP-MCNC: 4.3 G/DL (ref 3.5–5.2)
ALP SERPL-CCNC: 95 U/L (ref 55–135)
ALT SERPL W/O P-5'-P-CCNC: 66 U/L (ref 10–44)
ANION GAP SERPL CALC-SCNC: 13 MMOL/L (ref 8–16)
AST SERPL-CCNC: 33 U/L (ref 10–40)
BASOPHILS # BLD AUTO: 0.06 K/UL (ref 0–0.2)
BASOPHILS NFR BLD: 0.6 % (ref 0–1.9)
BILIRUB SERPL-MCNC: 0.8 MG/DL (ref 0.1–1)
BILIRUB UR QL STRIP: NEGATIVE
BUN SERPL-MCNC: 16 MG/DL (ref 6–20)
CALCIUM SERPL-MCNC: 9 MG/DL (ref 8.7–10.5)
CHLORIDE SERPL-SCNC: 105 MMOL/L (ref 95–110)
CLARITY UR REFRACT.AUTO: CLEAR
CO2 SERPL-SCNC: 20 MMOL/L (ref 23–29)
COLOR UR AUTO: YELLOW
CREAT SERPL-MCNC: 1.1 MG/DL (ref 0.5–1.4)
DIFFERENTIAL METHOD BLD: ABNORMAL
EOSINOPHIL # BLD AUTO: 0.3 K/UL (ref 0–0.5)
EOSINOPHIL NFR BLD: 2.7 % (ref 0–8)
ERYTHROCYTE [DISTWIDTH] IN BLOOD BY AUTOMATED COUNT: 12.2 % (ref 11.5–14.5)
EST. GFR  (NO RACE VARIABLE): >60 ML/MIN/1.73 M^2
GLUCOSE SERPL-MCNC: 141 MG/DL (ref 70–110)
GLUCOSE UR QL STRIP: NEGATIVE
HCT VFR BLD AUTO: 50.6 % (ref 40–54)
HGB BLD-MCNC: 17.9 G/DL (ref 14–18)
HGB UR QL STRIP: ABNORMAL
IMM GRANULOCYTES # BLD AUTO: 0.05 K/UL (ref 0–0.04)
IMM GRANULOCYTES NFR BLD AUTO: 0.5 % (ref 0–0.5)
KETONES UR QL STRIP: NEGATIVE
LEUKOCYTE ESTERASE UR QL STRIP: NEGATIVE
LIPASE SERPL-CCNC: 25 U/L (ref 4–60)
LYMPHOCYTES # BLD AUTO: 2 K/UL (ref 1–4.8)
LYMPHOCYTES NFR BLD: 19.3 % (ref 18–48)
MCH RBC QN AUTO: 30.3 PG (ref 27–31)
MCHC RBC AUTO-ENTMCNC: 35.4 G/DL (ref 32–36)
MCV RBC AUTO: 86 FL (ref 82–98)
MONOCYTES # BLD AUTO: 0.6 K/UL (ref 0.3–1)
MONOCYTES NFR BLD: 5.7 % (ref 4–15)
NEUTROPHILS # BLD AUTO: 7.4 K/UL (ref 1.8–7.7)
NEUTROPHILS NFR BLD: 71.2 % (ref 38–73)
NITRITE UR QL STRIP: NEGATIVE
NRBC BLD-RTO: 0 /100 WBC
PH UR STRIP: 6 [PH] (ref 5–8)
PLATELET # BLD AUTO: 439 K/UL (ref 150–450)
PMV BLD AUTO: 8.6 FL (ref 9.2–12.9)
POTASSIUM SERPL-SCNC: 3.4 MMOL/L (ref 3.5–5.1)
PROT SERPL-MCNC: 8.3 G/DL (ref 6–8.4)
PROT UR QL STRIP: NEGATIVE
RBC # BLD AUTO: 5.91 M/UL (ref 4.6–6.2)
SODIUM SERPL-SCNC: 138 MMOL/L (ref 136–145)
SP GR UR STRIP: 1.01 (ref 1–1.03)
URN SPEC COLLECT METH UR: ABNORMAL
UROBILINOGEN UR STRIP-ACNC: NEGATIVE EU/DL
WBC # BLD AUTO: 10.32 K/UL (ref 3.9–12.7)

## 2024-01-03 PROCEDURE — 96375 TX/PRO/DX INJ NEW DRUG ADDON: CPT | Mod: ER

## 2024-01-03 PROCEDURE — 63600175 PHARM REV CODE 636 W HCPCS: Mod: ER | Performed by: EMERGENCY MEDICINE

## 2024-01-03 PROCEDURE — 25000003 PHARM REV CODE 250: Mod: ER | Performed by: EMERGENCY MEDICINE

## 2024-01-03 PROCEDURE — 99285 EMERGENCY DEPT VISIT HI MDM: CPT | Mod: 25,ER

## 2024-01-03 PROCEDURE — 81003 URINALYSIS AUTO W/O SCOPE: CPT | Mod: ER | Performed by: EMERGENCY MEDICINE

## 2024-01-03 PROCEDURE — 83690 ASSAY OF LIPASE: CPT | Mod: ER | Performed by: EMERGENCY MEDICINE

## 2024-01-03 PROCEDURE — 85025 COMPLETE CBC W/AUTO DIFF WBC: CPT | Mod: ER | Performed by: EMERGENCY MEDICINE

## 2024-01-03 PROCEDURE — 96361 HYDRATE IV INFUSION ADD-ON: CPT | Mod: ER

## 2024-01-03 PROCEDURE — 80053 COMPREHEN METABOLIC PANEL: CPT | Mod: ER | Performed by: EMERGENCY MEDICINE

## 2024-01-03 PROCEDURE — 96374 THER/PROPH/DIAG INJ IV PUSH: CPT | Mod: ER

## 2024-01-03 PROCEDURE — 25500020 PHARM REV CODE 255: Mod: ER | Performed by: EMERGENCY MEDICINE

## 2024-01-03 RX ORDER — HYDROMORPHONE HYDROCHLORIDE 2 MG/ML
1 INJECTION, SOLUTION INTRAMUSCULAR; INTRAVENOUS; SUBCUTANEOUS
Status: COMPLETED | OUTPATIENT
Start: 2024-01-03 | End: 2024-01-03

## 2024-01-03 RX ORDER — ONDANSETRON HYDROCHLORIDE 2 MG/ML
4 INJECTION, SOLUTION INTRAVENOUS
Status: COMPLETED | OUTPATIENT
Start: 2024-01-03 | End: 2024-01-03

## 2024-01-03 RX ADMIN — HYDROMORPHONE HYDROCHLORIDE 1 MG: 2 INJECTION INTRAMUSCULAR; INTRAVENOUS; SUBCUTANEOUS at 04:01

## 2024-01-03 RX ADMIN — SODIUM CHLORIDE 1000 ML: 9 INJECTION, SOLUTION INTRAVENOUS at 04:01

## 2024-01-03 RX ADMIN — ONDANSETRON 4 MG: 2 INJECTION INTRAMUSCULAR; INTRAVENOUS at 04:01

## 2024-01-03 RX ADMIN — IOHEXOL 75 ML: 350 INJECTION, SOLUTION INTRAVENOUS at 05:01

## 2024-01-03 NOTE — ED PROVIDER NOTES
Encounter Date: 1/3/2024       History     Chief Complaint   Patient presents with    Abdominal Pain     RLQ pain     The history is provided by the patient.   Abdominal Pain  The current episode started several days ago. The onset of the illness was gradual. The problem has been gradually worsening. The abdominal pain is located in the RLQ. The abdominal pain does not radiate. The other symptoms of the illness include nausea and diarrhea. The other symptoms of the illness do not include fever, fatigue, melena, shortness of breath, vomiting or dysuria.   Symptoms associated with the illness do not include back pain.     Review of patient's allergies indicates:   Allergen Reactions    Naproxen sodium Hives and Shortness Of Breath     No reaction to ibuprofen     Past Medical History:   Diagnosis Date    Attention and concentration deficit     Hypertension      Past Surgical History:   Procedure Laterality Date    NO PAST SURGERIES       History reviewed. No pertinent family history.  Social History     Tobacco Use    Smoking status: Never    Smokeless tobacco: Never   Substance Use Topics    Alcohol use: Yes     Comment: Socially    Drug use: No     Review of Systems   Constitutional:  Negative for fatigue and fever.   HENT:  Negative for sore throat.    Respiratory:  Negative for shortness of breath.    Cardiovascular:  Negative for chest pain.   Gastrointestinal:  Positive for abdominal pain, diarrhea and nausea. Negative for melena and vomiting.   Genitourinary:  Negative for dysuria.   Musculoskeletal:  Negative for back pain.   Skin:  Negative for rash.   Neurological:  Negative for weakness.   Hematological:  Does not bruise/bleed easily.       Physical Exam     Initial Vitals [01/03/24 1559]   BP Pulse Resp Temp SpO2   (!) 132/92 107 18 98 °F (36.7 °C) 99 %      MAP       --         Physical Exam    Nursing note and vitals reviewed.  Constitutional: He appears well-developed and well-nourished.   HENT:   Head:  Normocephalic and atraumatic.   Mouth/Throat: Oropharynx is clear and moist.   Eyes: Conjunctivae and EOM are normal. Pupils are equal, round, and reactive to light.   Neck: Neck supple.   Normal range of motion.  Cardiovascular:  Normal rate, regular rhythm and normal heart sounds.           Pulmonary/Chest: Breath sounds normal.   Abdominal: Abdomen is soft. Bowel sounds are normal. There is abdominal tenderness in the right lower quadrant.   Musculoskeletal:         General: Normal range of motion.      Cervical back: Normal range of motion and neck supple.     Neurological: He is alert and oriented to person, place, and time. He has normal strength.   Skin: Skin is warm and dry.   Psychiatric: He has a normal mood and affect. Thought content normal.         ED Course   Procedures  Labs Reviewed   CBC W/ AUTO DIFFERENTIAL - Abnormal; Notable for the following components:       Result Value    MPV 8.6 (*)     Immature Grans (Abs) 0.05 (*)     All other components within normal limits   COMPREHENSIVE METABOLIC PANEL - Abnormal; Notable for the following components:    Potassium 3.4 (*)     CO2 20 (*)     Glucose 141 (*)     ALT 66 (*)     All other components within normal limits   URINALYSIS, REFLEX TO URINE CULTURE - Abnormal; Notable for the following components:    Occult Blood UA Trace (*)     All other components within normal limits    Narrative:     Specimen Source->Urine   LIPASE         6:14 PM I assumed care/ case discussed with Dr. Elena in detail; data reviewed, patient interviewed and examined.   Patient presents with abdominal pain and right lower quadrant tenderness, relatively nonspecific history and exam, await CT.  Initial data unremarkable.    Roberto Alejandre MD           Imaging Results              CT Abdomen Pelvis With IV Contrast NO Oral Contrast (Final result)  Result time 01/03/24 18:26:11      Final result by Carlton Moura MD (01/03/24 18:26:11)                   Impression:       Findings as above.  Normal appendix    All CT scans at this facility use dose modulation, iterative reconstruction, and/or weight based dosing when appropriate to reduce radiation dose to as low as reasonably achievable.      Electronically signed by: Carlton Moura  Date:    01/03/2024  Time:    18:26               Narrative:    EXAMINATION:  CT ABDOMEN PELVIS WITH IV CONTRAST    CLINICAL HISTORY:  RLQ abdominal pain (Age >= 14y);    TECHNIQUE:  Low dose axial images, sagittal and coronal reformations were obtained from the lung bases to the pubic symphysis following the IV administration of 100 mL of Omnipaque 350.    COMPARISON:  None    FINDINGS:  Heart: Normal size as far as seen. No effusion as far as seen.    Lung Bases: Clear.    Liver: Fatty infiltration liver no focal lesions.    Gallbladder: No calcified gallstones.    Bile Ducts: No dilatation.    Pancreas: No mass. No peripancreatic fat stranding.    Spleen: Normal.    Adrenals: Normal.    Kidneys/Ureters: Normal enhancement.  No mass or  hydroureteronephrosis.  Punctate nonobstructing renal calculi.    Bladder: No wall thickening.    Reproductive organs: Normal.    GI Tract/Mesentery: No evidence of bowel obstruction or inflammation.  Punctate hyperdense foci within the colon may relate to ingested material.  No evidence of acute appendicitis.  Normal appendix    Peritoneal Space: No ascites or free air.    Retroperitoneum: No significant adenopathy.    Abdominal wall: Small fat containing umbilical hernia    Vasculature: No aneurysm.    Bones: No acute fracture. No suspicious lytic or sclerotic lesions.                                       Medications   HYDROmorphone (PF) injection 1 mg (1 mg Intravenous Given 1/3/24 1618)   ondansetron injection 4 mg (4 mg Intravenous Given 1/3/24 1619)   sodium chloride 0.9% bolus 1,000 mL 1,000 mL (0 mLs Intravenous Stopped 1/3/24 1712)   iohexoL (OMNIPAQUE 350) injection 75 mL (75 mLs Intravenous Given 1/3/24  1718)         7:15 PM Minimal residual right lower quadrant tenderness to deep palpation but no rebound, guarding, or other convincing sign of acute pathology.  Counseled in detail, routine expectant management as an outpatient with good ER return precautions    Roberto Alejandre MD        Medical Decision Making  Problems Addressed:  Right lower quadrant abdominal pain: acute illness or injury    Amount and/or Complexity of Data Reviewed  Labs: ordered. Decision-making details documented in ED Course.  Radiology: ordered. Decision-making details documented in ED Course.    Risk  Prescription drug management.  Decision regarding hospitalization.      Additional MDM:   Differential Diagnosis:   Appendicitis, mesenteric adenitis, other cause of abdominal pain, gastroenteritis, colitis                                    Clinical Impression:  Final diagnoses:  [R10.31] Right lower quadrant abdominal pain (Primary)          ED Disposition Condition    Discharge Stable          ED Prescriptions    None       Follow-up Information       Follow up With Specialties Details Why Contact Info    [facility] - Emergency Dept Emergency Medicine  As needed [id number] [facility] [address]  [facility] [id number] [phone]    David Good MD Family Medicine  As needed [address]  [address]  [facility] [address]  [phone]               Roberto Alejandre MD  01/03/24 7475

## 2024-01-04 NOTE — DISCHARGE INSTRUCTIONS
As discussed, no significant findings on testing tonight.     Likely mild intestinal virus, but watch closely and return here or your doctor as needed.

## 2025-02-19 ENCOUNTER — RESULTS FOLLOW-UP (OUTPATIENT)
Dept: INTERNAL MEDICINE | Facility: CLINIC | Age: 40
End: 2025-02-19

## 2025-02-19 ENCOUNTER — OFFICE VISIT (OUTPATIENT)
Dept: INTERNAL MEDICINE | Facility: CLINIC | Age: 40
End: 2025-02-19
Payer: COMMERCIAL

## 2025-02-19 ENCOUNTER — HOSPITAL ENCOUNTER (OUTPATIENT)
Dept: RADIOLOGY | Facility: HOSPITAL | Age: 40
Discharge: HOME | End: 2025-02-19
Attending: NURSE PRACTITIONER
Payer: COMMERCIAL

## 2025-02-19 DIAGNOSIS — R06.02 SHORTNESS OF BREATH: ICD-10-CM

## 2025-02-19 DIAGNOSIS — R05.9 COUGH, UNSPECIFIED TYPE: ICD-10-CM

## 2025-02-19 DIAGNOSIS — J06.9 UPPER RESPIRATORY TRACT INFECTION, UNSPECIFIED TYPE: Primary | ICD-10-CM

## 2025-02-19 PROBLEM — F98.8 ATTENTION DEFICIT DISORDER (ADD) WITHOUT HYPERACTIVITY: Status: ACTIVE | Noted: 2018-08-14

## 2025-02-19 PROBLEM — T46.4X5A ACE-INHIBITOR COUGH: Status: ACTIVE | Noted: 2019-04-24

## 2025-02-19 PROBLEM — J30.9 CHRONIC ALLERGIC RHINITIS: Status: ACTIVE | Noted: 2023-08-28

## 2025-02-19 PROBLEM — F51.01 PRIMARY INSOMNIA: Status: ACTIVE | Noted: 2020-10-16

## 2025-02-19 PROBLEM — K76.0 FATTY LIVER: Status: ACTIVE | Noted: 2018-12-07

## 2025-02-19 PROBLEM — I10 ESSENTIAL HYPERTENSION: Status: ACTIVE | Noted: 2018-08-14

## 2025-02-19 PROBLEM — R05.8 ACE-INHIBITOR COUGH: Status: ACTIVE | Noted: 2019-04-24

## 2025-02-19 PROBLEM — E78.2 MIXED HYPERLIPIDEMIA: Status: ACTIVE | Noted: 2022-09-30

## 2025-02-19 PROBLEM — E55.9 VITAMIN D DEFICIENCY DISEASE: Status: ACTIVE | Noted: 2017-04-05

## 2025-02-19 PROCEDURE — 71046 X-RAY EXAM CHEST 2 VIEWS: CPT | Mod: TC,PO

## 2025-02-19 RX ORDER — IBUPROFEN 800 MG/1
800 TABLET ORAL EVERY 8 HOURS PRN
COMMUNITY
Start: 2025-02-14

## 2025-02-19 RX ORDER — METHYLPREDNISOLONE 4 MG/1
TABLET ORAL
Qty: 21 TABLET | Refills: 0 | Status: SHIPPED | OUTPATIENT
Start: 2025-02-19

## 2025-02-19 RX ORDER — PROMETHAZINE HYDROCHLORIDE AND DEXTROMETHORPHAN HYDROBROMIDE 6.25; 15 MG/5ML; MG/5ML
5 SYRUP ORAL EVERY 4 HOURS PRN
Qty: 180 ML | Refills: 0 | Status: SHIPPED | OUTPATIENT
Start: 2025-02-19 | End: 2025-03-01

## 2025-02-19 RX ORDER — AZITHROMYCIN 250 MG/1
TABLET, FILM COATED ORAL
Qty: 6 TABLET | Refills: 0 | Status: SHIPPED | OUTPATIENT
Start: 2025-02-19 | End: 2025-02-19

## 2025-02-19 RX ORDER — PROMETHAZINE HYDROCHLORIDE AND DEXTROMETHORPHAN HYDROBROMIDE 6.25; 15 MG/5ML; MG/5ML
5 SYRUP ORAL
COMMUNITY
Start: 2025-02-14 | End: 2025-02-19

## 2025-02-19 RX ORDER — AMOXICILLIN AND CLAVULANATE POTASSIUM 875; 125 MG/1; MG/1
1 TABLET, FILM COATED ORAL EVERY 12 HOURS
Qty: 14 TABLET | Refills: 0 | Status: SHIPPED | OUTPATIENT
Start: 2025-02-19 | End: 2025-02-26

## 2025-02-19 NOTE — PROGRESS NOTES
Subjective:       Patient ID: Marco Antonio Chavez is a 39 y.o. male.    Chief Complaint: Cough ( X 1 week)  The patient location is: [place]  The chief complaint leading to consultation is: cough, shortness of breath    Visit type: audiovisual    Face to Face time with patient: 10 minutes  15 minutes of total time spent on the encounter, which includes face to face time and non-face to face time preparing to see the patient (eg, review of tests), Obtaining and/or reviewing separately obtained history, Documenting clinical information in the electronic or other health record, Independently interpreting results (not separately reported) and communicating results to the patient/family/caregiver, or Care coordination (not separately reported).         Each patient to whom he or she provides medical services by telemedicine is:  (1) informed of the relationship between the physician and patient and the respective role of any other health care provider with respect to management of the patient; and (2) notified that he or she may decline to receive medical services by telemedicine and may withdraw from such care at any time.    Notes:     History of Present Illness    CHIEF COMPLAINT:  - Mr. Chavez presents with worsening cough.     HPI:  Mr. Chavez reports respiratory symptoms that started with what seemed like a viral infection on Friday. He was tested on Friday at urgent care, negative for covid, flu and strep. Discharged after having steroid injection, with promethazine DM. He has shortness of breath, particularly with exertion and lying down,  also also nasal congestion, sore throat, and post-nasal drip. He has had a fever, but none since Friday. He has been taking promethazine DM for symptom relief, as well as OTC medications including Advil sinus.    He denies current wheezing, nausea, vomiting, and diarrhea.      Answers submitted by the patient for this visit:  Cough Questionnaire (Submitted on 2/19/2025)  Chief  Complaint: Cough  Chronicity: new  Onset: in the past 7 days  Progression since onset: gradually worsening  Frequency: every few hours  Cough characteristics: productive of sputum  ear congestion: No  heartburn: No  hemoptysis: No  nasal congestion: Yes  sweats: No  weight loss: No  Aggravated by: cold air  asthma: No  bronchiectasis: No  bronchitis: No  COPD: No  emphysema: No  pneumonia: No  Treatments tried: prescription cough suppressant  Improvement on treatment: mild    Problem List[1]      Past Surgical History:   Procedure Laterality Date    NO PAST SURGERIES         Current Medications[2]    Review of Systems   Constitutional:  Positive for fatigue and fever (none since Friday). Negative for chills.   HENT:  Positive for congestion, postnasal drip, rhinorrhea and sore throat. Negative for ear pain.    Respiratory:  Positive for cough and shortness of breath. Negative for wheezing.    Cardiovascular:  Positive for chest pain. Negative for palpitations.   Gastrointestinal:  Negative for abdominal pain, diarrhea, nausea and vomiting.   Musculoskeletal:  Negative for myalgias.   Skin:  Negative for rash.   Allergic/Immunologic: Negative for environmental allergies.   Neurological:  Positive for headaches. Negative for dizziness and light-headedness.       Objective:   There were no vitals taken for this visit.     Physical Exam  Constitutional:       General: He is not in acute distress.     Appearance: Normal appearance. He is ill-appearing.   HENT:      Head: Normocephalic.   Pulmonary:      Effort: Pulmonary effort is normal. No respiratory distress.      Comments: Persistent cough  Neurological:      Mental Status: He is alert and oriented to person, place, and time.   Psychiatric:         Mood and Affect: Mood normal.         Assessment & Plan     Problem List Items Addressed This Visit    None  Visit Diagnoses         Upper respiratory tract infection, unspecified type    -  Primary    Relevant  Medications    amoxicillin-clavulanate 875-125mg (AUGMENTIN) 875-125 mg per tablet    promethazine-dextromethorphan (PROMETHAZINE-DM) 6.25-15 mg/5 mL Syrp      Cough, unspecified type        Relevant Orders    X-Ray Chest PA And Lateral (Completed)      Shortness of breath        Relevant Orders    X-Ray Chest PA And Lateral (Completed)        Tylenol/ibuprofen for pain and fever.   Hand hygiene.   Maintain adequate hydration.   Antihistamine and flonase for nasal congestion and upper respiratory symptoms.   Rest.       Assessment & Plan    MEDICAL DECISION MAKING:  - Considering pneumonia as potential diagnosis  - Planned treatment based on chest XR results:  - - If negative for pneumonia: 1 antibiotic and a steroid  - - If positive for pneumonia: 2 antibiotics, no steroid  - Acknowledged previous steroid injection, noting its short-term effects    PATIENT EDUCATION:  - Explained differences between medications:  - - Mucinex D: contains expectorant and decongestant  - - Allergy relief (likely Zyrtec): antihistamine to reduce post-nasal drip  - - Cough suppressant (with dextromethorphan): helps suppress cough  - Discussed potential drowsiness from promethazine and cetirizine (Zyrtec)    ACTION ITEMS/LIFESTYLE:  - Mr. Chavez to obtain Mucinex DM or Mucinex cold sinus from pharmacy for mucus expectorant and cough suppression    MEDICATIONS:  - Started Mucinex D (guaifenesin with decongestant)  - Started allergy relief medication (likely cetirizine/Zyrtec)  - Continued promethazine DM, take if not causing drowsiness  - Take Robitussin or Delsym (dextromethorphan) if promethazine DM causes drowsiness    ORDERS:  - Chest XR ordered to rule out pneumonia    FOLLOW UP:  - Follow up after completing chest XR for further treatment planning  - Contact the office once chest XR results are available          Abx sent to pharmacy.         Portions of this note may have been created with voice recognition software. Occasional  ""wrong-word" or "sound-a-like" substitutions may have occurred due to the inherent limitations of voice recognition software. Please, read the note carefully and recognize, using context, where substitutions have occurred.       This note was generated with the assistance of ambient listening technology. Verbal consent was obtained by the patient and accompanying visitor(s) for the recording of patient appointment to facilitate this note. I attest to having reviewed and edited the generated note for accuracy, though some syntax or spelling errors may persist. Please contact the author of this note for any clarification.              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    Chronic allergic rhinitis    Essential hypertension    Fatty liver    Mixed hyperlipidemia    Primary insomnia    Vitamin D deficiency disease   [2]   Current Outpatient Medications:     amLODIPine (NORVASC) 5 MG tablet, Take 5 mg by mouth once daily., Disp: , Rfl:     dextroamphetamine-amphetamine (ADDERALL XR) 30 MG 24 hr capsule, Take 15 mg by mouth 3 (three) times daily., Disp: , Rfl:     ibuprofen (ADVIL,MOTRIN) 800 MG tablet, Take 800 mg by mouth every 8 (eight) hours as needed., Disp: , Rfl:     rosuvastatin (CRESTOR) 10 MG tablet, Take 10 mg by mouth every evening., Disp: , Rfl:     zolpidem (AMBIEN) 10 mg Tab, Take 1 tablet by mouth nightly as needed., Disp: , Rfl:     amoxicillin-clavulanate 875-125mg (AUGMENTIN) 875-125 mg per tablet, Take 1 tablet by mouth every 12 (twelve) hours. for 7 days, Disp: 14 tablet, Rfl: 0    promethazine-dextromethorphan (PROMETHAZINE-DM) 6.25-15 mg/5 mL Syrp, Take 5 mLs by mouth every 4 (four) hours as needed., Disp: 180 mL, Rfl: 0    "